# Patient Record
Sex: FEMALE | Race: WHITE | URBAN - NONMETROPOLITAN AREA
[De-identification: names, ages, dates, MRNs, and addresses within clinical notes are randomized per-mention and may not be internally consistent; named-entity substitution may affect disease eponyms.]

---

## 2018-10-11 LAB
LITHIUM LEVEL: 0.9 MMOL/L (ref 0.6–1.2)
T3 FREE: 2.3 PG/ML (ref 2–4.4)
T4 FREE: 1.5 NG/DL (ref 0.9–1.7)
TSH SERPL DL<=0.05 MIU/L-ACNC: 0.02 UIU/ML (ref 0.27–4.2)

## 2024-12-18 ENCOUNTER — OFFICE VISIT (OUTPATIENT)
Age: 59
End: 2024-12-18

## 2024-12-18 VITALS — BODY MASS INDEX: 28.28 KG/M2 | WEIGHT: 176 LBS | HEIGHT: 66 IN

## 2024-12-18 DIAGNOSIS — M25.551 RIGHT HIP PAIN: Primary | ICD-10-CM

## 2024-12-18 DIAGNOSIS — M67.951 TENDINOPATHY OF RIGHT GLUTEAL REGION: ICD-10-CM

## 2024-12-18 RX ORDER — LEVOTHYROXINE SODIUM 175 UG/1
TABLET ORAL
COMMUNITY
Start: 2024-12-05

## 2024-12-18 RX ORDER — LIDOCAINE HYDROCHLORIDE 10 MG/ML
2.5 INJECTION, SOLUTION INFILTRATION; PERINEURAL ONCE
Status: COMPLETED | OUTPATIENT
Start: 2024-12-18 | End: 2024-12-18

## 2024-12-18 RX ORDER — TRAZODONE HYDROCHLORIDE 150 MG/1
TABLET ORAL
COMMUNITY
Start: 2024-11-27

## 2024-12-18 RX ORDER — PANTOPRAZOLE SODIUM 40 MG/1
40 TABLET, DELAYED RELEASE ORAL DAILY
COMMUNITY
Start: 2024-12-09

## 2024-12-18 RX ORDER — LAMOTRIGINE 200 MG/1
TABLET ORAL
COMMUNITY
Start: 2024-11-27

## 2024-12-18 RX ORDER — LITHIUM CARBONATE 300 MG/1
CAPSULE ORAL
COMMUNITY
Start: 2024-12-05

## 2024-12-18 RX ORDER — BETAMETHASONE SODIUM PHOSPHATE AND BETAMETHASONE ACETATE 3; 3 MG/ML; MG/ML
12 INJECTION, SUSPENSION INTRA-ARTICULAR; INTRALESIONAL; INTRAMUSCULAR; SOFT TISSUE ONCE
Status: COMPLETED | OUTPATIENT
Start: 2024-12-18 | End: 2024-12-18

## 2024-12-18 RX ORDER — BUPIVACAINE HYDROCHLORIDE 5 MG/ML
5 INJECTION, SOLUTION EPIDURAL; INTRACAUDAL ONCE
Status: COMPLETED | OUTPATIENT
Start: 2024-12-18 | End: 2024-12-18

## 2024-12-18 RX ORDER — CLONAZEPAM 0.5 MG/1
TABLET ORAL
COMMUNITY
Start: 2024-12-13

## 2024-12-18 RX ORDER — QUETIAPINE FUMARATE 200 MG/1
TABLET, FILM COATED ORAL
COMMUNITY
Start: 2024-11-27

## 2024-12-18 RX ADMIN — BETAMETHASONE SODIUM PHOSPHATE AND BETAMETHASONE ACETATE 12 MG: 3; 3 INJECTION, SUSPENSION INTRA-ARTICULAR; INTRALESIONAL; INTRAMUSCULAR; SOFT TISSUE at 10:30

## 2024-12-18 RX ADMIN — LIDOCAINE HYDROCHLORIDE 2.5 ML: 10 INJECTION, SOLUTION INFILTRATION; PERINEURAL at 10:31

## 2024-12-18 RX ADMIN — BUPIVACAINE HYDROCHLORIDE 25 MG: 5 INJECTION, SOLUTION EPIDURAL; INTRACAUDAL at 10:31

## 2024-12-18 NOTE — PROGRESS NOTES
Abduction:  5   Flexion:  100   External rotation:  40   Internal rotation:  20     Muscle Strength   Abduction: 3/5   Adduction: 5/5   Flexion: 5/5     Tests   TAMARA: negative  [place]: negative    Other   Erythema: absent  Scars: absent  Sensation: normal  Pulse: present          Radiology:      XR HIP 2-3 VW W PELVIS RIGHT    Result Date: 12/18/2024  AP view of the pelvis, AP & Lateral view of right hip was obtained in the office on today's visit, reviewed and interpreted by me.  Imaging quality is adequate for review.  There are no fractures or dislocations present, both hips are well preserved.  She has retained lumbar instrumentation.       Assessment:   1. Right hip pain  -     lidocaine 1 % injection 2.5 mL; 2.5 mL, Intra-artICUlar, ONCE, 1 dose, On Wed 12/18/24 at 0945  -     BUPivacaine (PF) (MARCAINE) 0.5 % injection 25 mg; 25 mg (5 mL), Intra-artICUlar, ONCE, 1 dose, On Wed 12/18/24 at 0945  -     betamethasone acetate-betamethasone sodium phosphate (CELESTONE) injection 12 mg; 12 mg, Intra-artICUlar, ONCE, 1 dose, On Wed 12/18/24 at 0945  -     Amb External Referral To Physical Therapy  2. Tendinopathy of right gluteal region  -     lidocaine 1 % injection 2.5 mL; 2.5 mL, Intra-artICUlar, ONCE, 1 dose, On Wed 12/18/24 at 0945  -     BUPivacaine (PF) (MARCAINE) 0.5 % injection 25 mg; 25 mg (5 mL), Intra-artICUlar, ONCE, 1 dose, On Wed 12/18/24 at 0945  -     betamethasone acetate-betamethasone sodium phosphate (CELESTONE) injection 12 mg; 12 mg, Intra-artICUlar, ONCE, 1 dose, On Wed 12/18/24 at 0945  -     Amb External Referral To Physical Therapy     PROCEDURE NOTE: After risks/benefits were explained and verbal consent was given, the patient was cleansed with alcohol and Chloraprep. Under sterile conditions, 2 cc of Lidocaine without epinephrine, 5 cc of Marcaine, and 12 mg of Celestone was injected with a 3 inch 22-gauge spinal needle into the trochanteric region of the laterality: right hip. The

## 2025-05-29 ENCOUNTER — OFFICE VISIT (OUTPATIENT)
Age: 60
End: 2025-05-29
Payer: MEDICARE

## 2025-05-29 VITALS — WEIGHT: 153 LBS | BODY MASS INDEX: 24.59 KG/M2 | HEIGHT: 66 IN

## 2025-05-29 DIAGNOSIS — M25.551 RIGHT HIP PAIN: ICD-10-CM

## 2025-05-29 DIAGNOSIS — M67.951 TENDINOPATHY OF RIGHT GLUTEAL REGION: Primary | ICD-10-CM

## 2025-05-29 PROCEDURE — G8420 CALC BMI NORM PARAMETERS: HCPCS | Performed by: PHYSICIAN ASSISTANT

## 2025-05-29 PROCEDURE — 1036F TOBACCO NON-USER: CPT | Performed by: PHYSICIAN ASSISTANT

## 2025-05-29 PROCEDURE — G8427 DOCREV CUR MEDS BY ELIG CLIN: HCPCS | Performed by: PHYSICIAN ASSISTANT

## 2025-05-29 PROCEDURE — 3017F COLORECTAL CA SCREEN DOC REV: CPT | Performed by: PHYSICIAN ASSISTANT

## 2025-05-29 PROCEDURE — 99213 OFFICE O/P EST LOW 20 MIN: CPT | Performed by: PHYSICIAN ASSISTANT

## 2025-05-29 NOTE — PROGRESS NOTES
[facility] Note - Established Patient    NAME:  Constance Tripp   : 1965  MRN: [id number]      2025      CHIEF COMPLAINT:   Chief Complaint   Patient presents with    Hip Pain       HISTORY OF PRESENT ILLNESS:   Constance Tripp  returns today for follow up of the right lateral hip pain and weakness.  She has had PT for her right hip.  She does feel stronger and the injection helped quite a bit with her pain.  About a 3-4 weeks ago her symptoms started to return with soreness/pain to the lateral right hip..  Since last visit, pain has  returned . She is here by herself today.       Past Medical History:        Diagnosis Date    Bipolar 1 disorder (HCC)     Spinal stenosis     Thyroid cancer (HCC)        Past Surgical History:        Procedure Laterality Date    BACK SURGERY      GALLBLADDER SURGERY      HERNIA REPAIR         Current Medications:   Prior to Admission medications    Medication Sig Start Date End Date Taking? Authorizing Provider   clonazePAM (KLONOPIN) 0.5 MG tablet  24  Yes ProviderMaile MD   lamoTRIgine (LAMICTAL) 200 MG tablet TAKE 1 & 1/2 (ONE & ONE-HALF) TABLETS BY MOUTH ONCE DAILY 24  Yes ProviderMaile MD   levothyroxine (SYNTHROID) 175 MCG tablet  24  Yes Provider, MD Maile   lithium 300 MG capsule  24  Yes ProviderMaile MD   pantoprazole (PROTONIX) 40 MG tablet Take 1 tablet by mouth daily 24  Yes ProviderMaile MD   QUEtiapine (SEROQUEL) 200 MG tablet TAKE 2 TABLETS BY MOUTH ONCE DAILY AT NIGHT AT BEDTIME 24  Yes Provider, MD Maile   traZODone (DESYREL) 150 MG tablet TAKE 1 TO 2 TABLETS BY MOUTH ONCE DAILY AT NIGHT AT BEDTIME 24  Yes Provider, MD Maile       Allergies:    Penicillins and Sulfa antibiotics    REVIEW OF SYSTEMS:   System Neg/Pos Details   Constitutional negative   Fatigue and Fever.   Respiratory negative   Cough and Dyspnea.   Cardio negative   Chest pain.   GI negative

## 2025-06-17 ENCOUNTER — HOSPITAL ENCOUNTER (OUTPATIENT)
Dept: MRI IMAGING | Age: 60
Discharge: HOME | End: 2025-06-17
Payer: MEDICARE

## 2025-06-17 ENCOUNTER — PATIENT MESSAGE (OUTPATIENT)
Age: 60
End: 2025-06-17

## 2025-06-17 DIAGNOSIS — M25.551 RIGHT HIP PAIN: ICD-10-CM

## 2025-06-17 DIAGNOSIS — M67.951 TENDINOPATHY OF RIGHT GLUTEAL REGION: ICD-10-CM

## 2025-06-17 PROCEDURE — 73721 MRI JNT OF LWR EXTRE W/O DYE: CPT

## 2025-06-18 ENCOUNTER — TELEPHONE (OUTPATIENT)
Age: 60
End: 2025-06-18

## 2025-06-25 ENCOUNTER — OFFICE VISIT (OUTPATIENT)
Age: 60
End: 2025-06-25
Payer: MEDICARE

## 2025-06-25 VITALS — WEIGHT: 148 LBS | HEIGHT: 66 IN | BODY MASS INDEX: 23.78 KG/M2

## 2025-06-25 DIAGNOSIS — M25.551 RIGHT HIP PAIN: ICD-10-CM

## 2025-06-25 DIAGNOSIS — M67.951 TENDINOPATHY OF RIGHT GLUTEAL REGION: Primary | ICD-10-CM

## 2025-06-25 PROCEDURE — 1036F TOBACCO NON-USER: CPT | Performed by: PHYSICIAN ASSISTANT

## 2025-06-25 PROCEDURE — 99213 OFFICE O/P EST LOW 20 MIN: CPT | Performed by: PHYSICIAN ASSISTANT

## 2025-06-25 PROCEDURE — 3017F COLORECTAL CA SCREEN DOC REV: CPT | Performed by: PHYSICIAN ASSISTANT

## 2025-06-25 PROCEDURE — G8427 DOCREV CUR MEDS BY ELIG CLIN: HCPCS | Performed by: PHYSICIAN ASSISTANT

## 2025-06-25 PROCEDURE — G8420 CALC BMI NORM PARAMETERS: HCPCS | Performed by: PHYSICIAN ASSISTANT

## 2025-06-26 ENCOUNTER — TELEPHONE (OUTPATIENT)
Age: 60
End: 2025-06-26

## 2025-06-27 NOTE — PROGRESS NOTES
_________________________________                 ______________________________________   Electronically signed by: ZI MCGOWAN M.D.  Date:     06/17/2025    --------------------------------------------------------------------------------------------------------------------    Assessment:   Encounter Diagnoses   Name Primary?    Tendinopathy of right gluteal region Yes    Right hip pain         Plan:  She would like to seek repair of the gluteal tendon at this time. We will set this up for her.    Return for we will call her to set up surgery for her right gluteal repair.     No orders of the defined types were placed in this encounter.        Electronically signed by Dante Jack PA-C on 6/27/2025 at 12:24 PM